# Patient Record
Sex: MALE | Race: OTHER | Employment: FULL TIME | URBAN - METROPOLITAN AREA
[De-identification: names, ages, dates, MRNs, and addresses within clinical notes are randomized per-mention and may not be internally consistent; named-entity substitution may affect disease eponyms.]

---

## 2017-01-27 RX ORDER — AMLODIPINE BESYLATE 10 MG/1
10 TABLET ORAL DAILY
COMMUNITY

## 2017-01-27 RX ORDER — PERINDOPRIL ERBUMINE 8 MG/1
8 TABLET ORAL DAILY
COMMUNITY

## 2017-01-27 RX ORDER — ATORVASTATIN CALCIUM 40 MG/1
40 TABLET, FILM COATED ORAL DAILY
COMMUNITY

## 2017-01-27 NOTE — H&P
Jose Larry    PATIENT'S NAME: Rachel Liu   ATTENDING PHYSICIAN: Kimberly Yepez MD   PATIENT ACCOUNT#:   [de-identified]    LOCATION:  [facility]  MEDICAL RECORD #:   [id number]       YOB: 1960  ADMISSION DATE:       01/30/2017    H drugs. He is . He works as a  and . REVIEW OF SYSTEMS:  Weight is the same. Appetite good. No fever. He wears reader eyeglasses and had cyst surgery of the eyes. Denies any ear, nose, mouth, throat problems.   Histo pyelogram, possible biopsy. He should quit smoking. I have offered Chantix, which he declines. He should see a gastroenterologist for GI workup because of the guaiac positive stool, including a colonoscopy.   He will check his blood pressure as an outpat

## 2017-01-28 ENCOUNTER — HOSPITAL ENCOUNTER (OUTPATIENT)
Dept: CT IMAGING | Facility: HOSPITAL | Age: 57
Discharge: HOME | End: 2017-01-28
Attending: UROLOGY
Payer: COMMERCIAL

## 2017-01-28 DIAGNOSIS — R31.9 HEMATURIA, UNSPECIFIED: ICD-10-CM

## 2017-01-28 LAB — CREAT BLD-MCNC: 0.8 MG/DL (ref 0.5–1.5)

## 2017-01-28 PROCEDURE — 82565 ASSAY OF CREATININE: CPT

## 2017-01-28 PROCEDURE — 74178 CT ABD&PLV WO CNTR FLWD CNTR: CPT

## 2017-01-30 ENCOUNTER — SURGERY (OUTPATIENT)
Age: 57
End: 2017-01-30

## 2017-01-30 ENCOUNTER — HOSPITAL ENCOUNTER (OUTPATIENT)
Facility: HOSPITAL | Age: 57
Setting detail: HOSPITAL OUTPATIENT SURGERY
Discharge: HOME | End: 2017-01-30
Attending: UROLOGY | Admitting: UROLOGY
Payer: COMMERCIAL

## 2017-01-30 ENCOUNTER — APPOINTMENT (OUTPATIENT)
Dept: GENERAL RADIOLOGY | Facility: HOSPITAL | Age: 57
End: 2017-01-30
Attending: UROLOGY
Payer: COMMERCIAL

## 2017-01-30 ENCOUNTER — ANESTHESIA (OUTPATIENT)
Dept: SURGERY | Facility: HOSPITAL | Age: 57
End: 2017-01-30
Payer: COMMERCIAL

## 2017-01-30 ENCOUNTER — ANESTHESIA EVENT (OUTPATIENT)
Dept: SURGERY | Facility: HOSPITAL | Age: 57
End: 2017-01-30
Payer: COMMERCIAL

## 2017-01-30 VITALS
RESPIRATION RATE: 16 BRPM | SYSTOLIC BLOOD PRESSURE: 118 MMHG | OXYGEN SATURATION: 97 % | HEIGHT: 68 IN | TEMPERATURE: 98 F | DIASTOLIC BLOOD PRESSURE: 70 MMHG | WEIGHT: 183.88 LBS | BODY MASS INDEX: 27.87 KG/M2 | HEART RATE: 70 BPM

## 2017-01-30 DIAGNOSIS — N20.0 RIGHT KIDNEY STONE: Primary | ICD-10-CM

## 2017-01-30 PROCEDURE — 74420 UROGRAPHY RTRGR +-KUB: CPT

## 2017-01-30 PROCEDURE — BT141ZZ FLUOROSCOPY OF KIDNEYS, URETERS AND BLADDER USING LOW OSMOLAR CONTRAST: ICD-10-PCS | Performed by: UROLOGY

## 2017-01-30 RX ORDER — METOCLOPRAMIDE 10 MG/1
10 TABLET ORAL ONCE
Status: DISCONTINUED | OUTPATIENT
Start: 2017-01-30 | End: 2017-01-30 | Stop reason: HOSPADM

## 2017-01-30 RX ORDER — SODIUM CHLORIDE, SODIUM LACTATE, POTASSIUM CHLORIDE, CALCIUM CHLORIDE 600; 310; 30; 20 MG/100ML; MG/100ML; MG/100ML; MG/100ML
INJECTION, SOLUTION INTRAVENOUS CONTINUOUS PRN
Status: DISCONTINUED | OUTPATIENT
Start: 2017-01-30 | End: 2017-01-30 | Stop reason: SURG

## 2017-01-30 RX ORDER — DEXAMETHASONE SODIUM PHOSPHATE 4 MG/ML
VIAL (ML) INJECTION AS NEEDED
Status: DISCONTINUED | OUTPATIENT
Start: 2017-01-30 | End: 2017-01-30 | Stop reason: SURG

## 2017-01-30 RX ORDER — ONDANSETRON 2 MG/ML
INJECTION INTRAMUSCULAR; INTRAVENOUS AS NEEDED
Status: DISCONTINUED | OUTPATIENT
Start: 2017-01-30 | End: 2017-01-30 | Stop reason: SURG

## 2017-01-30 RX ORDER — MIDAZOLAM HYDROCHLORIDE 1 MG/ML
INJECTION INTRAMUSCULAR; INTRAVENOUS AS NEEDED
Status: DISCONTINUED | OUTPATIENT
Start: 2017-01-30 | End: 2017-01-30 | Stop reason: SURG

## 2017-01-30 RX ORDER — EPHEDRINE SULFATE 50 MG/ML
INJECTION, SOLUTION INTRAVENOUS AS NEEDED
Status: DISCONTINUED | OUTPATIENT
Start: 2017-01-30 | End: 2017-01-30 | Stop reason: SURG

## 2017-01-30 RX ORDER — FAMOTIDINE 20 MG/1
20 TABLET ORAL ONCE
Status: DISCONTINUED | OUTPATIENT
Start: 2017-01-30 | End: 2017-01-30 | Stop reason: HOSPADM

## 2017-01-30 RX ORDER — SODIUM CHLORIDE, SODIUM LACTATE, POTASSIUM CHLORIDE, CALCIUM CHLORIDE 600; 310; 30; 20 MG/100ML; MG/100ML; MG/100ML; MG/100ML
INJECTION, SOLUTION INTRAVENOUS CONTINUOUS
Status: DISCONTINUED | OUTPATIENT
Start: 2017-01-30 | End: 2017-01-30

## 2017-01-30 RX ORDER — HALOPERIDOL 5 MG/ML
0.25 INJECTION INTRAMUSCULAR ONCE AS NEEDED
Status: DISCONTINUED | OUTPATIENT
Start: 2017-01-30 | End: 2017-01-30

## 2017-01-30 RX ORDER — HYDROCODONE BITARTRATE AND ACETAMINOPHEN 5; 325 MG/1; MG/1
1 TABLET ORAL AS NEEDED
Status: DISCONTINUED | OUTPATIENT
Start: 2017-01-30 | End: 2017-01-30

## 2017-01-30 RX ORDER — NALOXONE HYDROCHLORIDE 0.4 MG/ML
80 INJECTION, SOLUTION INTRAMUSCULAR; INTRAVENOUS; SUBCUTANEOUS AS NEEDED
Status: DISCONTINUED | OUTPATIENT
Start: 2017-01-30 | End: 2017-01-30

## 2017-01-30 RX ORDER — ONDANSETRON 2 MG/ML
4 INJECTION INTRAMUSCULAR; INTRAVENOUS ONCE AS NEEDED
Status: DISCONTINUED | OUTPATIENT
Start: 2017-01-30 | End: 2017-01-30

## 2017-01-30 RX ORDER — PHENAZOPYRIDINE HYDROCHLORIDE 95 MG/1
95 TABLET ORAL 3 TIMES DAILY PRN
Qty: 15 TABLET | Refills: 6 | Status: SHIPPED | OUTPATIENT
Start: 2017-01-30

## 2017-01-30 RX ORDER — ACETAMINOPHEN 325 MG/1
650 TABLET ORAL ONCE
Status: COMPLETED | OUTPATIENT
Start: 2017-01-30 | End: 2017-01-30

## 2017-01-30 RX ORDER — MORPHINE SULFATE 2 MG/ML
2 INJECTION, SOLUTION INTRAMUSCULAR; INTRAVENOUS EVERY 10 MIN PRN
Status: DISCONTINUED | OUTPATIENT
Start: 2017-01-30 | End: 2017-01-30

## 2017-01-30 RX ORDER — LIDOCAINE HYDROCHLORIDE 10 MG/ML
INJECTION, SOLUTION EPIDURAL; INFILTRATION; INTRACAUDAL; PERINEURAL AS NEEDED
Status: DISCONTINUED | OUTPATIENT
Start: 2017-01-30 | End: 2017-01-30 | Stop reason: SURG

## 2017-01-30 RX ORDER — MORPHINE SULFATE 10 MG/ML
6 INJECTION, SOLUTION INTRAMUSCULAR; INTRAVENOUS EVERY 10 MIN PRN
Status: DISCONTINUED | OUTPATIENT
Start: 2017-01-30 | End: 2017-01-30

## 2017-01-30 RX ORDER — HYDROCODONE BITARTRATE AND ACETAMINOPHEN 5; 325 MG/1; MG/1
2 TABLET ORAL AS NEEDED
Status: DISCONTINUED | OUTPATIENT
Start: 2017-01-30 | End: 2017-01-30

## 2017-01-30 RX ORDER — HYDROMORPHONE HYDROCHLORIDE 1 MG/ML
0.6 INJECTION, SOLUTION INTRAMUSCULAR; INTRAVENOUS; SUBCUTANEOUS EVERY 5 MIN PRN
Status: DISCONTINUED | OUTPATIENT
Start: 2017-01-30 | End: 2017-01-30

## 2017-01-30 RX ORDER — MORPHINE SULFATE 4 MG/ML
4 INJECTION, SOLUTION INTRAMUSCULAR; INTRAVENOUS EVERY 10 MIN PRN
Status: DISCONTINUED | OUTPATIENT
Start: 2017-01-30 | End: 2017-01-30

## 2017-01-30 RX ORDER — HYDROMORPHONE HYDROCHLORIDE 1 MG/ML
0.4 INJECTION, SOLUTION INTRAMUSCULAR; INTRAVENOUS; SUBCUTANEOUS EVERY 5 MIN PRN
Status: DISCONTINUED | OUTPATIENT
Start: 2017-01-30 | End: 2017-01-30

## 2017-01-30 RX ORDER — GLYCOPYRROLATE 0.2 MG/ML
INJECTION INTRAMUSCULAR; INTRAVENOUS AS NEEDED
Status: DISCONTINUED | OUTPATIENT
Start: 2017-01-30 | End: 2017-01-30 | Stop reason: SURG

## 2017-01-30 RX ORDER — GENTAMICIN SULFATE 40 MG/ML
INJECTION, SOLUTION INTRAMUSCULAR; INTRAVENOUS AS NEEDED
Status: DISCONTINUED | OUTPATIENT
Start: 2017-01-30 | End: 2017-01-30 | Stop reason: HOSPADM

## 2017-01-30 RX ORDER — HYDROMORPHONE HYDROCHLORIDE 1 MG/ML
0.2 INJECTION, SOLUTION INTRAMUSCULAR; INTRAVENOUS; SUBCUTANEOUS EVERY 5 MIN PRN
Status: DISCONTINUED | OUTPATIENT
Start: 2017-01-30 | End: 2017-01-30

## 2017-01-30 RX ADMIN — MIDAZOLAM HYDROCHLORIDE 2 MG: 1 INJECTION INTRAMUSCULAR; INTRAVENOUS at 11:09:00

## 2017-01-30 RX ADMIN — GLYCOPYRROLATE 0.2 MG: 0.2 INJECTION INTRAMUSCULAR; INTRAVENOUS at 11:10:00

## 2017-01-30 RX ADMIN — ONDANSETRON 4 MG: 2 INJECTION INTRAMUSCULAR; INTRAVENOUS at 11:10:00

## 2017-01-30 RX ADMIN — SODIUM CHLORIDE, SODIUM LACTATE, POTASSIUM CHLORIDE, CALCIUM CHLORIDE: 600; 310; 30; 20 INJECTION, SOLUTION INTRAVENOUS at 11:43:00

## 2017-01-30 RX ADMIN — DEXAMETHASONE SODIUM PHOSPHATE 4 MG: 4 MG/ML VIAL (ML) INJECTION at 11:10:00

## 2017-01-30 RX ADMIN — SODIUM CHLORIDE, SODIUM LACTATE, POTASSIUM CHLORIDE, CALCIUM CHLORIDE: 600; 310; 30; 20 INJECTION, SOLUTION INTRAVENOUS at 11:06:00

## 2017-01-30 RX ADMIN — LIDOCAINE HYDROCHLORIDE 25 MG: 10 INJECTION, SOLUTION EPIDURAL; INFILTRATION; INTRACAUDAL; PERINEURAL at 11:10:00

## 2017-01-30 RX ADMIN — EPHEDRINE SULFATE 10 MG: 50 INJECTION, SOLUTION INTRAVENOUS at 11:20:00

## 2017-01-30 NOTE — BRIEF OP NOTE
[facility] POST ANESTHESIA CARE UNIT  Brief Op Note       Patients Name: Osvaldo Sue  Attending Physician: Lalitha Pool MD  Operating Physician: Chio Cosme MD  CSN: [id number]     Location:  OR  MRN: [id number]    YOB: 1960

## 2017-01-30 NOTE — OPERATIVE REPORT
[facility]    PATIENT'S NAME: Gama Josef   ATTENDING PHYSICIAN: Philip Evans MD   OPERATING PHYSICIAN: Philip Evans MD   PATIENT ACCOUNT#:   [de-identified]    LOCATION:  [place] [address]  MEDICAL RECORD #:   [id number]       DATE OF patient was brought to the operating theater. General anesthesia was administered. He took antibiotics 2 hours prior to the procedure. He was carefully prepped and draped in the usual sterile fashion in the semi-lithotomy position.   At this point,  mm lower pole right kidney stone. My recommendations are he quit smoking as this can increase the risk of kidney and bladder cancer. We can watch the stone conservatively. We should do a metabolic stone workup.   We can try inversion therapy, and hopeful

## 2017-01-30 NOTE — ANESTHESIA POSTPROCEDURE EVALUATION
Patient: Radah Guajardo    Procedure Summary     Date Anesthesia Start Anesthesia Stop Room / Location    01/30/17 110 1143 [facility] OR 14 / [facility] MAIN OR       Procedure Diagnosis Surgeon Responsible Provider    CYSTOSCOPY RETROGRADE (Bilateral ) (RIGHT LOW P

## 2017-01-30 NOTE — INTERVAL H&P NOTE
Pre-op Diagnosis: hematuria    The above referenced H&P was reviewed by Velta Saint, MD on 1/30/2017, the patient was examined and no significant changes have occurred in the patient's condition since the H&P was performed.   I discussed with the patie

## 2017-01-30 NOTE — ANESTHESIA PREPROCEDURE EVALUATION
Anesthesia PreOp Note    HPI:     Radhames Gottlieb is a 49year old male who presents for preoperative consultation requested by: Mary Corral MD    Date of Surgery: 1/30/2017    Procedure(s):  CYSTOSCOPY RETROGRADE  Indication: hematuria    History Revie m (5' 8\") and weight is 83.416 kg (183 lb 14.4 oz). His oral temperature is 97.5 °F (36.4 °C). His blood pressure is 123/81 and his pulse is 64. His respiration is 16 and oxygen saturation is 96%.     01/27/17  0932 01/30/17  0938   BP:  123/81   Pulse:  6

## 2023-04-15 ENCOUNTER — HOSPITAL ENCOUNTER (OUTPATIENT)
Dept: CT IMAGING | Facility: HOSPITAL | Age: 63
Discharge: HOME | End: 2023-04-15
Attending: UROLOGY
Payer: COMMERCIAL

## 2023-04-15 DIAGNOSIS — R31.9 HEMATURIA, UNSPECIFIED TYPE: ICD-10-CM

## 2023-04-15 LAB
CREAT BLD-MCNC: 0.8 MG/DL
GFR SERPLBLD BASED ON 1.73 SQ M-ARVRAT: 101 ML/MIN/1.73M2 (ref 60–?)

## 2023-04-15 PROCEDURE — 74178 CT ABD&PLV WO CNTR FLWD CNTR: CPT | Performed by: UROLOGY

## 2023-04-15 PROCEDURE — 82565 ASSAY OF CREATININE: CPT

## 2023-04-15 PROCEDURE — 76377 3D RENDER W/INTRP POSTPROCES: CPT | Performed by: UROLOGY

## 2023-12-19 ENCOUNTER — HOSPITAL ENCOUNTER (OUTPATIENT)
Dept: GENERAL RADIOLOGY | Facility: HOSPITAL | Age: 63
Discharge: HOME | End: 2023-12-19
Attending: ORTHOPAEDIC SURGERY
Payer: COMMERCIAL

## 2023-12-19 ENCOUNTER — OFFICE VISIT (OUTPATIENT)
Dept: ORTHOPEDICS CLINIC | Facility: CLINIC | Age: 63
End: 2023-12-19

## 2023-12-19 DIAGNOSIS — M79.621 PAIN IN RIGHT UPPER ARM: ICD-10-CM

## 2023-12-19 DIAGNOSIS — S46.211D RUPTURE OF RIGHT PROXIMAL BICEPS TENDON, SUBSEQUENT ENCOUNTER: Primary | ICD-10-CM

## 2023-12-19 PROCEDURE — 73030 X-RAY EXAM OF SHOULDER: CPT | Performed by: ORTHOPAEDIC SURGERY

## 2023-12-19 PROCEDURE — 99244 OFF/OP CNSLTJ NEW/EST MOD 40: CPT | Performed by: ORTHOPAEDIC SURGERY

## 2023-12-19 NOTE — H&P
NURSING INTAKE COMMENTS:   Chief Complaint   Patient presents with    Arm Pain     Right upper arm - onset in the summer while changing a tire he felt a pull in the biceps and he started to have pain in the inner aspect of the upper arm - has pain sometimes with certain movements and has reduced strength in his arm - pt is R handed        HPI: This 66year old right-hand-dominant male presents today with 4 months of right proximal biceps tendon rupture. He has a little discomfort occasionally but overall is pain-free. 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 He denies numbness or tingling. The history is given by the nurse above is accurate. He never had any bruising. His medical history is fairly noncontributory. He is in very good shape physically. Again he works a heavy job despite his age and is currently working. He quit smoking in 2016. He lives with his wife and kids in a house without stairs. His hobbies are mostly fishing either on [place], or even the [facility]. He does have prediabetes but denies neuropathy. Past Medical History:   Diagnosis Date    High blood pressure     High cholesterol     Prediabetes      Past Surgical History:   Procedure Laterality Date    EXTRACTION ERUPTED TOOTH/EXR       Current Outpatient Medications   Medication Sig Dispense Refill    Phenazopyridine HCl (AZO-GESIC) 95 MG Oral Tab Take 1 tablet (97.2 mg total) by mouth 3 (three) times daily as needed for Pain. 15 tablet 6    Perindopril Erbumine 8 MG Oral Tab Take 8 mg by mouth daily. AmLODIPine Besylate 10 MG Oral Tab Take 10 mg by mouth daily. Atorvastatin Calcium 40 MG Oral Tab Take 40 mg by mouth daily.        No Known Allergies  Family History   Problem Relation Age of Onset    Diabetes Father     Hypertension Mother     Diabetes Brother      No family Hx of DVT/PE    Social History     Occupational History    Not on file   Tobacco Use    Smoking status: Former     Packs/day: 0.50     Years: 20.00     Additional pack years: 0.00     Total pack years: 10.00     Types: Cigarettes     Quit date: 2016     Years since quittin.9    Smokeless tobacco: Not on file   Substance and Sexual Activity    Alcohol use: Yes     Comment: socially    Drug use: No    Sexual activity: Not on file        Review of Systems:  GENERAL: feels generally well, no significant weight loss or weight gain  SKIN: no ulcerated or worrisome skin lesions  EYES:denies blurred vision or double vision  HEENT: denies new nasal congestion, sinus pain or ST  LUNGS: denies shortness of breath  CARDIOVASCULAR: denies chest pain  GI: no hematemesis, no worsening heartburn, no diarrhea  : no dysuria, no blood in urine, no difficulty urinating, no incontinence  MUSCULOSKELETAL: no other musculoskeletal complaints other than in HPI  NEURO: no numbness or tingling, no weakness or balance disorder  PSYCHE: no depression or anxiety  HEMATOLOGIC: no hx of blood dyscrasia, no Hx DVT/PE  ENDOCRINE: no thyroid or diabetes issues  ALL/ASTHMA: no new hx of severe allergy or asthma    Physical Examination:    There were no vitals taken for this visit. Constitutional: appears well hydrated, alert and responsive, no acute distress noted  Extremities: Exam of the right shoulder and arm show a Keaton muscle. He was able to see this for himself. The left shoulder does not have this. The distal biceps tendon is fully intact and nontender. Musculoskeletal: Full motion of both shoulders in all directions with 5 out of 5 strength. He had very good strength to both upper extremities. No deficits. No acromioclavicular tenderness. Neurological: Normal motor and sensory right upper extremity. Imaging: X-rays in the office today show acromioclavicular arthritis which is asymptomatic. The glenohumeral joint appears normal.      No results found.      No results found for: \"WBC\", \"HGB\", \"PLT\" Lab Results   Component Value Date    GFRNAA >60 01/28/2017    GFRAA >60 01/28/2017        Assessment and Plan:  Diagnoses and all orders for this visit:    Rupture of right proximal biceps tendon, subsequent encounter    Pain in right upper arm  -     XR SHOULDER, COMPLETE (MIN 2 VIEWS), RIGHT (CPT=73030); Future        Assessment: Above diagnosis. 4 months old. Functioning well with little to no pain. Plan: I discussed that we typically do not repair these. I discussed that if his right shoulder has persistent pain, we will get MRI and possibly talk about arthroscopy to debride the remainder of the tendon and inspect the rotator cuff. For now however he does not even need therapy in my opinion. He may continue to work in his heavy job and I will see him as needed. I did let him know that the defect will be permanent. Follow Up: No follow-ups on file.     Denzel Wolf MD

## 2025-08-21 ENCOUNTER — OFFICE VISIT (OUTPATIENT)
Dept: OTOLARYNGOLOGY | Facility: CLINIC | Age: 65
End: 2025-08-21

## 2025-08-21 ENCOUNTER — OFFICE VISIT (OUTPATIENT)
Dept: AUDIOLOGY | Facility: CLINIC | Age: 65
End: 2025-08-21

## 2025-08-21 VITALS — WEIGHT: 205 LBS | BODY MASS INDEX: 31 KG/M2

## 2025-08-21 DIAGNOSIS — R42 DIZZINESS: Primary | ICD-10-CM

## 2025-08-21 DIAGNOSIS — H81.90 EPISODIC RECURRENT VERTIGO: Primary | ICD-10-CM

## 2025-08-21 PROCEDURE — 99203 OFFICE O/P NEW LOW 30 MIN: CPT | Performed by: SPECIALIST

## 2025-08-21 PROCEDURE — 92567 TYMPANOMETRY: CPT | Performed by: AUDIOLOGIST

## 2025-08-21 PROCEDURE — 92557 COMPREHENSIVE HEARING TEST: CPT | Performed by: AUDIOLOGIST

## 2025-08-21 RX ORDER — METFORMIN HYDROCHLORIDE 500 MG/1
1000 TABLET, EXTENDED RELEASE ORAL DAILY
COMMUNITY
Start: 2025-02-22

## (undated) DEVICE — STERILE LATEX POWDER-FREE SURGICAL GLOVESWITH NITRILE COATING: Brand: PROTEXIS

## (undated) DEVICE — SOL H2O 3000ML IRRIG

## (undated) DEVICE — KENDALL SCD EXPRESS SLEEVES, KNEE LENGTH, MEDIUM: Brand: KENDALL SCD

## (undated) DEVICE — ISOVUE 300 10X100ML VIAL

## (undated) DEVICE — LUBRICANT JLY SURGILUBE 2OZ

## (undated) DEVICE — CATH URET CONE TIP 8FR 138008

## (undated) DEVICE — SUCTION CANISTER, 3000CC,SAFELINER: Brand: DEROYAL

## (undated) DEVICE — SOL  .9 1000ML BTL

## (undated) DEVICE — UROLOGY DRAIN BAG STERILE

## (undated) DEVICE — CYSTO PACK: Brand: MEDLINE INDUSTRIES, INC.

## (undated) NOTE — IP AVS SNAPSHOT
[facility] - [facility]    [address], [place], [place] ~ [phone]                Discharge Summary   1/30/2017    Monty Moss           Admission Information        Provider Department    1/30/2017 Clarita Pineda MD [facility] · For you to have a sore throat if you had a breathing tube during surgery (while you were asleep!). The sore throat should get better within 48 hours.  You can gargle with warm salt water (1/2 tsp in 4 oz warm water) or use a throat lozenge for comfort  · Your health and feedback are important to us!     If you receive a NSQIP questionnaire, and have questions please contact:   Patrick Samuels RN, [address]  [phone]      Discharge References/Att Enter your [employer] Activation Code exactly as it appears below along with your Zip Code and Date of Birth to complete the sign-up process. If you do not sign up before the expiration date, you must request a new code.     Your unique [employer] Access Code: [email or web address]